# Patient Record
Sex: MALE | Employment: STUDENT | URBAN - METROPOLITAN AREA
[De-identification: names, ages, dates, MRNs, and addresses within clinical notes are randomized per-mention and may not be internally consistent; named-entity substitution may affect disease eponyms.]

---

## 2024-05-02 ENCOUNTER — HOSPITAL ENCOUNTER (OUTPATIENT)
Dept: RADIOLOGY | Facility: CLINIC | Age: 19
Discharge: HOME | End: 2024-05-02
Payer: COMMERCIAL

## 2024-05-02 ENCOUNTER — OFFICE VISIT (OUTPATIENT)
Dept: ORTHOPEDIC SURGERY | Facility: CLINIC | Age: 19
End: 2024-05-02
Payer: COMMERCIAL

## 2024-05-02 DIAGNOSIS — M25.511 BILATERAL SHOULDER PAIN, UNSPECIFIED CHRONICITY: ICD-10-CM

## 2024-05-02 DIAGNOSIS — M25.311 INSTABILITY OF RIGHT SHOULDER JOINT: ICD-10-CM

## 2024-05-02 DIAGNOSIS — M75.101 NONTRAUMATIC TEAR OF RIGHT ROTATOR CUFF, UNSPECIFIED TEAR EXTENT: ICD-10-CM

## 2024-05-02 DIAGNOSIS — M25.512 BILATERAL SHOULDER PAIN, UNSPECIFIED CHRONICITY: ICD-10-CM

## 2024-05-02 PROCEDURE — 99203 OFFICE O/P NEW LOW 30 MIN: CPT | Performed by: ORTHOPAEDIC SURGERY

## 2024-05-02 PROCEDURE — 73030 X-RAY EXAM OF SHOULDER: CPT | Mod: 50

## 2024-05-02 PROCEDURE — 99213 OFFICE O/P EST LOW 20 MIN: CPT | Performed by: ORTHOPAEDIC SURGERY

## 2024-05-02 PROCEDURE — 73030 X-RAY EXAM OF SHOULDER: CPT | Mod: BILATERAL PROCEDURE | Performed by: ORTHOPAEDIC SURGERY

## 2024-05-02 NOTE — PROGRESS NOTES
History of present: History of a labral tear RI noncontrast study had a diagnostic shoulder scope right side done at the [facility] but they found no labral tear it sounds like they did a little bit of debridement or cleanout    He originally did well but now has recurrent shoulder pain    He has quite internal rotation contracture on the right side when compared to left        Past medical history:    The patient's past medical history, family history, social history and review of systems were documented on the patient's medical intake form.  The medical intake form was reviewed and scanned into the electronic medical record for future use.  History is otherwise negative except as stated in the history of present illness.    Physical exam:    General: Alert and oriented to person place and time.  No acute distress and breathing comfortably, pleasant and cooperative with examination.    Head and neck exam: Head is normocephalic atraumatic.    Neck: Supple no visible swelling or deformity.    Cardiovascular: Good perfusion to affected extremities without signs or symptoms of chest pain.    Lungs no audible wheezing or labored breathing on examination.    Abdominal exam: Nondistended nontender    Extremities: Right shoulder flexes up to about 170 abduct to 80 external rotates to 60 internal rotation significantly limited to around L4-L5 right shoulder when compared to the left shoulder which goes to almost T12    Both shoulders have an achy soreness    There is no sulcus sign bilaterally    He is motor intact C5-T1    There is no obvious posterior instability bilaterally    He has some mild overhead secondary impingement due to the subtle instability    He has good strength with push pull good subscapularis good pec strength no obvious tendon rupture    There is no bruising ecchymosis or bicep speeds sign    Most of the pain is over the shoulder joint posterior superior laterally    He does describe pain on both  shoulder similar nature but overall he feels that the right shoulder is significantly more weak and affected and obviously has the internal rotation contracture        Diagnostic studies: X-rays essentially unremarkable bilateral shoulder AP axillary view    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

## 2024-05-28 ENCOUNTER — APPOINTMENT (OUTPATIENT)
Dept: RADIOLOGY | Facility: HOSPITAL | Age: 19
End: 2024-05-28
Payer: COMMERCIAL

## 2024-06-18 ENCOUNTER — HOSPITAL ENCOUNTER (OUTPATIENT)
Dept: RADIOLOGY | Facility: HOSPITAL | Age: 19
Discharge: HOME | End: 2024-06-18
Payer: COMMERCIAL

## 2024-06-18 DIAGNOSIS — M75.101 NONTRAUMATIC TEAR OF RIGHT ROTATOR CUFF, UNSPECIFIED TEAR EXTENT: ICD-10-CM

## 2024-06-18 DIAGNOSIS — M25.311 INSTABILITY OF RIGHT SHOULDER JOINT: ICD-10-CM

## 2024-06-18 DIAGNOSIS — M25.512 BILATERAL SHOULDER PAIN, UNSPECIFIED CHRONICITY: ICD-10-CM

## 2024-06-18 DIAGNOSIS — M25.511 BILATERAL SHOULDER PAIN, UNSPECIFIED CHRONICITY: ICD-10-CM

## 2024-06-18 PROCEDURE — 73222 MRI JOINT UPR EXTREM W/DYE: CPT | Mod: RT

## 2024-06-18 PROCEDURE — A9575 INJ GADOTERATE MEGLUMI 0.1ML: HCPCS | Mod: SE | Performed by: ORTHOPAEDIC SURGERY

## 2024-06-18 PROCEDURE — 2500000005 HC RX 250 GENERAL PHARMACY W/O HCPCS: Mod: SE | Performed by: ORTHOPAEDIC SURGERY

## 2024-06-18 PROCEDURE — 73222 MRI JOINT UPR EXTREM W/DYE: CPT | Mod: RIGHT SIDE | Performed by: RADIOLOGY

## 2024-06-18 PROCEDURE — 2500000004 HC RX 250 GENERAL PHARMACY W/ HCPCS (ALT 636 FOR OP/ED): Mod: SE | Performed by: ORTHOPAEDIC SURGERY

## 2024-06-18 PROCEDURE — 77002 NEEDLE LOCALIZATION BY XRAY: CPT | Mod: RIGHT SIDE | Performed by: INTERNAL MEDICINE

## 2024-06-18 PROCEDURE — 23350 INJECTION FOR SHOULDER X-RAY: CPT | Mod: RIGHT SIDE | Performed by: INTERNAL MEDICINE

## 2024-06-18 PROCEDURE — 2550000001 HC RX 255 CONTRASTS: Mod: SE | Performed by: ORTHOPAEDIC SURGERY

## 2024-06-18 PROCEDURE — 73040 CONTRAST X-RAY OF SHOULDER: CPT | Mod: RT

## 2024-06-18 PROCEDURE — A4216 STERILE WATER/SALINE, 10 ML: HCPCS | Mod: SE | Performed by: ORTHOPAEDIC SURGERY

## 2024-06-18 RX ORDER — LIDOCAINE HYDROCHLORIDE 10 MG/ML
3 INJECTION, SOLUTION EPIDURAL; INFILTRATION; INTRACAUDAL; PERINEURAL ONCE
Status: COMPLETED | OUTPATIENT
Start: 2024-06-18 | End: 2024-06-18

## 2024-06-18 RX ORDER — SODIUM CHLORIDE 9 MG/ML
8 INJECTION, SOLUTION INTRAMUSCULAR; INTRAVENOUS; SUBCUTANEOUS EVERY 8 HOURS SCHEDULED
Status: DISCONTINUED | OUTPATIENT
Start: 2024-06-18 | End: 2024-06-19 | Stop reason: HOSPADM

## 2024-06-18 RX ORDER — GADOTERATE MEGLUMINE 376.9 MG/ML
0.1 INJECTION INTRAVENOUS
Status: COMPLETED | OUTPATIENT
Start: 2024-06-18 | End: 2024-06-18

## 2024-12-02 ENCOUNTER — OFFICE VISIT (OUTPATIENT)
Dept: ORTHOPEDIC SURGERY | Facility: CLINIC | Age: 19
End: 2024-12-02
Payer: COMMERCIAL

## 2024-12-02 DIAGNOSIS — S43.431A SUPERIOR LABRUM ANTERIOR-TO-POSTERIOR (SLAP) TEAR OF RIGHT SHOULDER: Primary | ICD-10-CM

## 2024-12-02 PROCEDURE — 99214 OFFICE O/P EST MOD 30 MIN: CPT | Performed by: STUDENT IN AN ORGANIZED HEALTH CARE EDUCATION/TRAINING PROGRAM

## 2024-12-02 PROCEDURE — 2500000004 HC RX 250 GENERAL PHARMACY W/ HCPCS (ALT 636 FOR OP/ED): Performed by: STUDENT IN AN ORGANIZED HEALTH CARE EDUCATION/TRAINING PROGRAM

## 2024-12-02 PROCEDURE — 20610 DRAIN/INJ JOINT/BURSA W/O US: CPT | Mod: RT | Performed by: STUDENT IN AN ORGANIZED HEALTH CARE EDUCATION/TRAINING PROGRAM

## 2024-12-02 NOTE — PROGRESS NOTES
Chief Complaint   Patient presents with    Left Shoulder - Pain    Right Shoulder - Pain       HPI  Vickie is a 19-year-old male presenting today as a new patient for evaluation of bilateral shoulders.  Was previously seen by Dr. Elian Diaz.  Patient has had ongoing persistent bilateral shoulder pain for several years.  Initial injury began approximately 4 years ago when he was lifting at the gym doing squats and went to rack weights and felt both shoulders pulled back and did feel cracking on both sides.  Was initially seen by outside orthopedic surgeon.  Does have a history of right-sided diagnostic shoulder scope for concerns of labral tear done at [facility].  Surgery proved no labral tear.  Originally did well postoperatively but now has recurrent shoulder pain.  Dr. Diaz did recommend MRI arthrogram to the right shoulder to evaluate labral pathology with follow-up.  Patient did follow through with MRI in June however did not follow-up with Dr. Diaz.  Patient presents today describing worsening of pain to bilateral shoulders as well as now having pain in the posterior lateral aspect of his neck bilaterally.  States that this neck issue has worsened as well alongside his shoulder symptoms over the last few months.  Shoulder pain is mostly anteriorly based and is exacerbated with any type of pressing motion.  Patient is a very active individual and this pain does impede on his daily life.  Does notice that this new neck pain does feel like it stems from the posterior lateral neck and shoots down into his shoulders even sometimes down into his arms.  He has attempted oral anti-inflammatories, physical therapy which he states did help for a period of time however he feels worse now than before.    No past medical history on file.    No past surgical history on file.     Not on File     Physical exam    General: Alert and oriented to place, person, and time.  No acute distress and breathing  comfortably; pleasant and cooperative with the examination.  HEENT: Head is normocephalic and atraumatic.  Neck: Supple, no visible swelling.  Cardiovascular: Good perfusion to the affected extremity.  Lungs: No audible wheezing or labored breathing.  Abdomen: Nondistended  HEME/Lymph : No visible abnormalities bilateral lower extremity    Extremity:  Bilateral shoulders:    Skin healthy to gross inspection  No gross deformity appreciated  No ecchymosis, no swelling, no gross atrophy  No tenderness to palpation over acromioclavicular joint  No tenderness to palpation over biceps tendon   No tenderness to palpation over the cervical spine    ROM:  Full forward flexion  Full abduction  Full external rotation  Internal rotation slightly decreased on right side compared to left  IR to T12 on left, L3 on right  Strength:  5/5 resisted elevation  5/5 resisted external rotation    Negative empty can test  Negative findings for external rotation against resistance   Negative lift off test   Negative Neer and Hawking´s test  Equivocal O'Gilberto test  Negative Speed's test  Positive Spurling´s test    Neurovascular exam normal distally          Diagnostics:  I personally reviewed x-rays dated 5/2/2024, MRA dated 6/18/2024 and agree with following findings  Narrative & Impression   Interpreted By:  Negrito Mcfadden,   STUDY:  MR ARTHROGRAM SHOULDER RIGHT;      INDICATION:  Signs/Symptoms:pain.      COMPARISON:  Plain film radiographs of the right shoulder performed on May 2, 2024      ACCESSION NUMBER(S):  [id number]      ORDERING CLINICIAN:  JAQUELINE BEST      TECHNIQUE:  Routine multiplanar multisequential MRI right shoulder after the  uneventful administration of dilute intra-articular gadolinium  contrast using the direct arthrography protocol.          FINDINGS:  Rotator cuff tendons normal. Tendon signal and morphology normal.  Rotator cuff musculature normal without atrophy or edema.      Biceps intact.      No evidence of  labral tear or detachment.      No glenohumeral ligamentous tear.      No glenohumeral chondral defects.      Some minimal hypertrophic change of the acromioclavicular joint  likely some early mild arthrosis. Alignment normal.      No evidence of fracture.      Small 1 cm cystic lesion of the proximal humeral metaphysis of  doubtful significance.      No evidence of fracture.      No marrow replacement process.                  IMPRESSION:  Minimal acromioclavicular arthrosis.      No evidence of other internal derangement right shoulder.      Signed by: Negrito Mcfadden 6/18/2024 5:18 PM  Dictation workstation:   [id number]       Procedure:  L Inj/Asp: R glenohumeral on 12/3/2024 2:13 PM  Indications: pain  Details: 22 G needle, posterior approach  Medications: 40 mg triamcinolone acetonide 40 mg/mL; 4 mL lidocaine 10 mg/mL (1 %)  Consent was given by the patient. Immediately prior to procedure a time out was called to verify the correct patient, procedure, equipment, support staff and site/side marked as required. Patient was prepped and draped in the usual sterile fashion.           Assessment:  19-year-old male with concerns for SLAP tear    Treatment plan:  The natural history of the condition and its associated treatment alternatives including surgical and nonsurgical options were discussed with the patient at length.  Treatment modalities including physical therapy, oral anti-inflammatories, cortisone injections, shoulder arthroscopy as well as risk benefits contraindications and alternatives to each all discussed with patient in detail today  Patient has attempted physical therapy and oral anti-inflammatories and would like to proceed with a cortisone injection today.  I discussed risks and benefits of corticosteroid injection and the patient would like to proceed.  Discussed risks of skin depigmentation and the rare but possible intravascular injection of local anesthetic which can cause palpitations or  arrhythmias. I discussed the possibility of a transient increase in blood sugar. I explained that the local anesthetic tends to work immediately, it may take 2-3 days for the full effect of the corticosteroid compound. Consent was obtained and side confirmed by the patient.  Patient will follow-up in 2 months for repeat evaluation  All of the patient's questions were answered.    Julian Medina PA-C    In a face to face encounter, I evaluated the patient and performed a physical examination, discussed pertinent diagnostic studies if indicated and discussed diagnosis and management strategies with both the patient and physician assistant / nurse practitioner.  I reviewed the PA/NP's note and agree with the documented findings and plan of care.     19-year-old male with long history of right shoulder pain worse than left shoulder pain.  He has a result of injury weight lifting.  States that his shoulder is never been the same for the past couple years time did have a diagnostic shoulder endoscopy at [facility] which provided little to no relief.  Pain particular at the shoulder region worsened with any type of pushing activity or flat bench type activities.  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  On physical exam he is tenderness outpatient anteriorly and posterior about the shoulder positive [place].  Otherwise 5 out of 5 rotator cuff strength overlying skin is intact normal muscular contour about the shoulder x-rays reviewed today no acute osseous abnormality prior MRI also reviewed which is overall unremarkable there is a cyst about the posterior proximal humerus may be secondary to diagnostic shoulder arthroscopy, prior injury versus incidental finding.  Assessment and plan 19-year-old male with possible occult labrum tear.  Given that he does have some neck and shoulder pain I do think that he would benefit from a intra-articular shoulder injection.  Discussed that this is  both diagnostic and therapeutic.  Patient is also very anxious in nature and I do think that this is leading to some of his pain and discomfort as he is focused on both his shoulder and his neck.  To help provide some support and discussed prior MRI results with patient in detail regarding his neck pain which I do think is likely nonoperative he is going to follow-up with our orthopedic PA spine team for discussion of this.  He will take a journal regarding relief or not relief with cortisone injection.    I discussed risks and benefits of corticosteroid injection and the patient would like to proceed.  Discussed risks of skin depigmentation and the rare but possible intravascular injection of local anesthetic which can cause palpitations or arrhythmias. I discussed the possibility of a transient increase in blood sugar. I explained that the local anesthetic tends to work immediately, it may take 2-3 days for the full effect of the corticosteroid compound. Consent was obtained and side confirmed by the patient.        Deshawn Mayen III, MD

## 2024-12-03 PROCEDURE — 20610 DRAIN/INJ JOINT/BURSA W/O US: CPT | Performed by: STUDENT IN AN ORGANIZED HEALTH CARE EDUCATION/TRAINING PROGRAM

## 2024-12-03 RX ORDER — TRIAMCINOLONE ACETONIDE 40 MG/ML
40 INJECTION, SUSPENSION INTRA-ARTICULAR; INTRAMUSCULAR
Status: COMPLETED | OUTPATIENT
Start: 2024-12-03 | End: 2024-12-03

## 2024-12-03 RX ORDER — LIDOCAINE HYDROCHLORIDE 10 MG/ML
4 INJECTION, SOLUTION INFILTRATION; PERINEURAL
Status: COMPLETED | OUTPATIENT
Start: 2024-12-03 | End: 2024-12-03

## 2024-12-19 ENCOUNTER — OFFICE VISIT (OUTPATIENT)
Dept: ORTHOPEDIC SURGERY | Facility: CLINIC | Age: 19
End: 2024-12-19
Payer: COMMERCIAL

## 2024-12-19 ENCOUNTER — APPOINTMENT (OUTPATIENT)
Dept: ORTHOPEDIC SURGERY | Facility: CLINIC | Age: 19
End: 2024-12-19
Payer: COMMERCIAL

## 2024-12-19 DIAGNOSIS — M54.2 NECK PAIN: Primary | ICD-10-CM

## 2024-12-19 PROCEDURE — 99213 OFFICE O/P EST LOW 20 MIN: CPT | Performed by: PHYSICIAN ASSISTANT

## 2025-01-13 ENCOUNTER — APPOINTMENT (OUTPATIENT)
Dept: ORTHOPEDIC SURGERY | Facility: CLINIC | Age: 20
End: 2025-01-13
Payer: COMMERCIAL

## 2025-02-03 ENCOUNTER — APPOINTMENT (OUTPATIENT)
Dept: ORTHOPEDIC SURGERY | Facility: CLINIC | Age: 20
End: 2025-02-03
Payer: COMMERCIAL

## 2025-02-07 ENCOUNTER — OFFICE VISIT (OUTPATIENT)
Dept: ORTHOPEDIC SURGERY | Facility: CLINIC | Age: 20
End: 2025-02-07
Payer: COMMERCIAL

## 2025-02-07 DIAGNOSIS — S43.431A SUPERIOR LABRUM ANTERIOR-TO-POSTERIOR (SLAP) TEAR OF RIGHT SHOULDER: Primary | ICD-10-CM

## 2025-02-07 NOTE — PROGRESS NOTES
Chief Complaint   Patient presents with    Left Shoulder - Follow-up, Pain    Right Shoulder - Follow-up, Pain     S/P INJ 12/3/24       HPI  Vickie is a 19-year-old male presenting today as a new patient for repeat evaluation of bilateral shoulders.  Patient has had ongoing persistent bilateral shoulder pain for several years.  Initial injury began approximately 4 years ago when he was lifting at the gym doing squats and went to rack weights and felt both shoulders pulled back and did feel cracking on both sides.  Was initially seen by outside orthopedic surgeon.  Does have a history of right-sided diagnostic shoulder arthroscopy for concerns of labral tear done at [facility].  Surgery proved no labral tear.  Originally did well postoperatively but now has recurrent shoulder pain.  Patient was subsequently seen by Dr. Diaz did recommend MRI arthrogram to the right shoulder to evaluate labral pathology with follow-up.  Patient did follow through with MRI in June however did not follow-up with Dr. Diaz.  Patient presents today describing worsening of pain to bilateral shoulders as well as now having pain in the posterior lateral aspect of his neck bilaterally.  States that this neck issue has worsened as well alongside his shoulder symptoms over the last few months.  Did see our spine team 12/19/2024 we did provide a referral to [facility] for conservative treatment.  Patient states he did go to 1 session they did some manual manipulation did seem to make his shoulder feel much worse.  Did not return.  Patient localizes this shoulder pain is mostly anteriorly based and is exacerbated with any type of pressing motion.  Patient states this does seem to be worsening has noticed more of this similar type of pain with internal rotation as well as forward flexion.  Patient is a very active individual and this pain does impede on his daily life. He has attempted oral anti-inflammatories, physical  therapy which he states did help for a period of time however he feels worse now than before.  Last visit 12-2-24 we did proceed with a glenohumeral cortisone injection which patient states did provide him with significant relief however only lasted for a few weeks.  Does feel like he has returned to how he felt prior to this cortisone injection may be even a little worse.    No past medical history on file.    No past surgical history on file.     Not on File     Physical exam    General: Alert and oriented to place, person, and time.  No acute distress and breathing comfortably; pleasant and cooperative with the examination.  HEENT: Head is normocephalic and atraumatic.  Neck: Supple, no visible swelling.  Cardiovascular: Good perfusion to the affected extremity.  Lungs: No audible wheezing or labored breathing.  Abdomen: Nondistended  HEME/Lymph : No visible abnormalities bilateral lower extremity    Extremity:  Bilateral shoulders:    Skin healthy to gross inspection  No gross deformity appreciated  No ecchymosis, no swelling, no gross atrophy  No tenderness to palpation over acromioclavicular joint  No tenderness to palpation over biceps tendon   No tenderness to palpation over the cervical spine    ROM:  Full forward flexion  Full abduction  Full external rotation  Internal rotation slightly decreased on right side compared to left  IR to T12 on left, L3 on right  Strength:  5/5 resisted elevation  5/5 resisted external rotation    Negative empty can test  Equivocal findings for external rotation against resistance   Negative lift off test   Negative Neer and Hawking´s test  Equivocal O'Gilberto test  Negative Speed's test  Positive Spurling´s test    Neurovascular exam normal distally          Diagnostics:  I personally reviewed x-rays dated 5/2/2024, MRA dated 6/18/2024 and agree with following findings  Narrative & Impression   Interpreted By:  Negrito Mcfadden,   STUDY:  MR ARTHROGRAM SHOULDER RIGHT;       INDICATION:  Signs/Symptoms:pain.      COMPARISON:  Plain film radiographs of the right shoulder performed on May 2, 2024      ACCESSION NUMBER(S):  [id number]      ORDERING CLINICIAN:  JAQUELINE BEST      TECHNIQUE:  Routine multiplanar multisequential MRI right shoulder after the  uneventful administration of dilute intra-articular gadolinium  contrast using the direct arthrography protocol.          FINDINGS:  Rotator cuff tendons normal. Tendon signal and morphology normal.  Rotator cuff musculature normal without atrophy or edema.      Biceps intact.      No evidence of labral tear or detachment.      No glenohumeral ligamentous tear.      No glenohumeral chondral defects.      Some minimal hypertrophic change of the acromioclavicular joint  likely some early mild arthrosis. Alignment normal.      No evidence of fracture.      Small 1 cm cystic lesion of the proximal humeral metaphysis of  doubtful significance.      No evidence of fracture.      No marrow replacement process.                  IMPRESSION:  Minimal acromioclavicular arthrosis.      No evidence of other internal derangement right shoulder.      Signed by: Negrito Mcfadden 6/18/2024 5:18 PM  Dictation workstation:   [id number]       Procedure:  Procedures    Assessment:  19-year-old male with concerns for SLAP tear    Treatment plan:  The natural history of the condition and its associated treatment alternatives including surgical and nonsurgical options were discussed with the patient at length.  Treatment modalities including physical therapy, oral anti-inflammatories, cortisone injections, shoulder arthroscopy as well as risk benefits contraindications and alternatives to each all discussed with patient in detail today  Patient has attempted physical therapy and oral anti-inflammatories as well as cortisone injection.   Given the patient did experience significant relief of his pain following glenohumeral cortisone injection does leave me to  believe there is intra-articular pathology.  Given the patient has attempted multiple forms of conservative treatment I do believe it is medically indicated to obtain an MR arthrogram of his shoulder to further evaluate   Patient will follow-up after MRA is obtained to discuss findings and treatment options  All of the patient's questions were answered.    Julian Medina PA-C    In a face to face encounter, I evaluated the patient and performed a physical examination, discussed pertinent diagnostic studies if indicated and discussed diagnosis and management strategies with both the patient and physician assistant / nurse practitioner.  I reviewed the PA/NP's note and agree with the documented findings and plan of care.     19-year-old male with long history of right shoulder pain worse than left shoulder pain.  He has a result of injury weight lifting.  States that his shoulder is never been the same for the past couple years time did have a diagnostic shoulder arthroscopy at [facility] which provided little to no relief.  Pain particular at the shoulder region worsened with any type of pushing activity or flat bench type activities.  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  On physical exam he is tenderness outpatient anteriorly and posterior about the shoulder positive [place].  Otherwise 5 out of 5 rotator cuff strength overlying skin is intact normal muscular contour about the shoulder x-rays reviewed today no acute osseous abnormality prior MRI also reviewed which is overall unremarkable there is a cyst about the posterior proximal humerus may be secondary to diagnostic shoulder arthroscopy, prior injury versus incidental finding.  Assessment and plan 19-year-old male with possible occult labrum tear.  Given the MRI was done about 8 months ago we will proceed with a repeat MRI to evaluate the posterior labrum as well as the biceps tendon junction.  Pending these findings  may benefit from a PRP injection versus diagnostic shoulder arthroscopy.  Will proceed with an MRI arthrogram of the shoulder for further update of internal derangement

## 2025-04-29 ENCOUNTER — HOSPITAL ENCOUNTER (OUTPATIENT)
Dept: RADIOLOGY | Facility: HOSPITAL | Age: 20
Discharge: HOME | End: 2025-04-29
Payer: COMMERCIAL

## 2025-04-29 DIAGNOSIS — S43.431A SUPERIOR LABRUM ANTERIOR-TO-POSTERIOR (SLAP) TEAR OF RIGHT SHOULDER: ICD-10-CM

## 2025-04-29 PROCEDURE — 2550000001 HC RX 255 CONTRASTS: Performed by: STUDENT IN AN ORGANIZED HEALTH CARE EDUCATION/TRAINING PROGRAM

## 2025-04-29 PROCEDURE — 73222 MRI JOINT UPR EXTREM W/DYE: CPT | Mod: RT

## 2025-04-29 PROCEDURE — 2500000004 HC RX 250 GENERAL PHARMACY W/ HCPCS (ALT 636 FOR OP/ED): Mod: JZ | Performed by: STUDENT IN AN ORGANIZED HEALTH CARE EDUCATION/TRAINING PROGRAM

## 2025-04-29 PROCEDURE — 73040 CONTRAST X-RAY OF SHOULDER: CPT | Mod: RT

## 2025-04-29 PROCEDURE — A9575 INJ GADOTERATE MEGLUMI 0.1ML: HCPCS | Performed by: STUDENT IN AN ORGANIZED HEALTH CARE EDUCATION/TRAINING PROGRAM

## 2025-04-29 RX ORDER — GADOTERATE MEGLUMINE 376.9 MG/ML
0.1 INJECTION INTRAVENOUS
Status: COMPLETED | OUTPATIENT
Start: 2025-04-29 | End: 2025-04-29

## 2025-04-29 RX ORDER — LIDOCAINE HYDROCHLORIDE 10 MG/ML
10 INJECTION, SOLUTION EPIDURAL; INFILTRATION; INTRACAUDAL; PERINEURAL ONCE
Status: COMPLETED | OUTPATIENT
Start: 2025-04-29 | End: 2025-04-29

## 2025-04-29 RX ADMIN — IOHEXOL 50 ML: 350 INJECTION, SOLUTION INTRAVENOUS at 14:55

## 2025-04-29 RX ADMIN — LIDOCAINE HYDROCHLORIDE 100 MG: 10 INJECTION, SOLUTION EPIDURAL; INFILTRATION; INTRACAUDAL; PERINEURAL at 15:20

## 2025-04-29 RX ADMIN — GADOTERATE MEGLUMINE 0.1 ML: 376.9 INJECTION INTRAVENOUS at 14:55
